# Patient Record
(demographics unavailable — no encounter records)

---

## 2025-05-21 NOTE — COUNSELING
[STD (testing, results, tx)] : STD (testing, results, tx) [Contraception/ Emergency Contraception/ Safe Sexual Practices] : contraception, emergency contraception, safe sexual practices

## 2025-05-21 NOTE — PLAN
[FreeTextEntry1] : Normal CBE and pelvic exam. Will order breast US given ongoing breast pain. Pap collected, sti testing offered and consents. Given ongoing dyspareunia and post coital bleeding recommend TVUS. Follow up after to review all results.

## 2025-05-21 NOTE — REASON FOR VISIT
[Annual] : an annual visit. [Language Line ] : provided by [employer]   [Interpreters_IDNumber] : [id number] [Interpreters_FullName] : Jacqueline Yoder [TWNoteComboBox1] : Ivorian

## 2025-05-21 NOTE — HISTORY OF PRESENT ILLNESS
[Patient reported PAP Smear was normal] : Patient reported PAP Smear was normal [HIV Test offered] : HIV Test offered [Syphilis test offered] : Syphilis test offered [Gonorrhea test offered] : Gonorrhea test offered [Chlamydia test offered] : Chlamydia test offered [Trichomonas test offered] : Trichomonas test offered [HPV test offered] : HPV test offered [Hepatitis B test offered] : Hepatitis B test offered [Hepatitis C test offered] : Hepatitis C test offered [IUD] : has an intrauterine device [Y] : Patient is sexually active [FreeTextEntry1] : 28-year-old female here today as new patient for well women exam. Also notes abnormal menses, dyspareunia, and post coital bleeding since placement of ParaGard in 2022. Would like to switch to Nexplanon in the future and remove the ParaGard IUD. States she was recommended the hormone free contraceptive after birth of her second child, notes no contraindications to DANIEL. No Familial hx of breast, ovarian or colon cancer.  [PapSmeardate] : 2023 [LMPDate] : 4/13/25 [PGHxTotal] : 3 [[facility]] : 2

## 2025-05-21 NOTE — PHYSICAL EXAM
[Appropriately responsive] : appropriately responsive [Alert] : alert [No Acute Distress] : no acute distress [No Lymphadenopathy] : no lymphadenopathy [Soft] : soft [Non-tender] : non-tender [Non-distended] : non-distended [No Lesions] : no lesions [No Mass] : no mass [Oriented x3] : oriented x3 [Examination Of The Breasts] : a normal appearance [No Masses] : no breast masses were palpable [Labia Majora] : normal [Labia Minora] : normal [Scant] : There was scant vaginal bleeding [IUD String] : an IUD string was noted [Normal] : normal [Uterine Adnexae] : normal

## 2025-07-07 NOTE — DISCUSSION/SUMMARY
[FreeTextEntry1] : I reviewed today's TVUS results and reassured that only ovarian cyst is regressing corpus luteum of the left. ovary. EML is 17mm which may correlate with cycle or may represent endometrial polyp. Patient scheduled for removal of Paragard IUD and replacement with Nexplanon at same visit on 7/22/25.  If irregular bleeding continues for several months after Nexplanon placement consider repeat US and possible Aveta.  Patient verbalizes understanding of and agreement with this plan.  All questions answered to patient's satisfaction.

## 2025-07-07 NOTE — HISTORY OF PRESENT ILLNESS
[FreeTextEntry1] : 30 yo   here for follow up of TVUS results ordered for evaluation of dyspareunia and post-coital bleeding per visit with Shira Weeks 5/21/25.  TVUS results today: Paragard correct placement noted EML 17.0, polyp cannot be ruled out Right ovary appears WNL Right ovary regressing corpus luteum cyst 2.7 cm x 2.2 x 2.9 Cervix appears WNL no adnexal masses seen Free fluid 5.02x0.98  Patient feels bleeding and pain are from Paragard and would like removal and replacement with Nexplanon on the same day if possible.   [IUD] : has an intrauterine device [Y] : Positive pregnancy history [LMPDate] : 6/18/25 [de-identified] : Paragard [PGHxTotal] : 3 [[facility]] : 2 [PGHxAbortions] : 1 [[facility]] : 2

## 2025-07-07 NOTE — HISTORY OF PRESENT ILLNESS
[FreeTextEntry1] : 28 yo   here for follow up of TVUS results ordered for evaluation of dyspareunia and post-coital bleeding per visit with Shira Weeks 5/21/25.  TVUS results today: Paragard correct placement noted EML 17.0, polyp cannot be ruled out Right ovary appears WNL Right ovary regressing corpus luteum cyst 2.7 cm x 2.2 x 2.9 Cervix appears WNL no adnexal masses seen Free fluid 5.02x0.98  Patient feels bleeding and pain are from Paragard and would like removal and replacement with Nexplanon on the same day if possible.   [IUD] : has an intrauterine device [Y] : Positive pregnancy history [LMPDate] : 6/18/25 [de-identified] : Paragard [PGHxTotal] : 3 [[facility]] : 2 [PGHxAbortions] : 1 [[facility] CenterxLiving] : 2

## 2025-07-22 NOTE — PROCEDURE
[IUD Removal] : intrauterine device (IUD) removal [Time out performed] : Pre-procedure time out performed.  Patient's name, date of birth and procedure confirmed. [Consent Obtained] : Consent obtained [Risks] : risks [Benefits] : benefits [Alternatives] : alternatives [Patient] : patient [Speculum Placed] : speculum placed [IUD Removed - Forceps] : IUD removed - forceps [IUD Discarded] : IUD discarded [Tolerated Well] : Patient tolerated the procedure well [No Complications] : no complications [Heavy Vaginal Bleeding] : for heavy vaginal bleeding [Pelvic Pain] : for pelvic pain [PRN] : as needed